# Patient Record
Sex: MALE | Race: WHITE | URBAN - METROPOLITAN AREA
[De-identification: names, ages, dates, MRNs, and addresses within clinical notes are randomized per-mention and may not be internally consistent; named-entity substitution may affect disease eponyms.]

---

## 2020-12-03 ENCOUNTER — OFFICE VISIT (OUTPATIENT)
Dept: URBAN - METROPOLITAN AREA CLINIC 27 | Facility: CLINIC | Age: 80
End: 2020-12-03
Payer: MEDICARE

## 2020-12-03 DIAGNOSIS — Z96.1 PRESENCE OF INTRAOCULAR LENS: ICD-10-CM

## 2020-12-03 PROCEDURE — 92134 CPTRZ OPH DX IMG PST SGM RTA: CPT | Performed by: OPHTHALMOLOGY

## 2020-12-03 PROCEDURE — 67028 INJECTION EYE DRUG: CPT | Performed by: OPHTHALMOLOGY

## 2020-12-03 PROCEDURE — 92014 COMPRE OPH EXAM EST PT 1/>: CPT | Performed by: OPHTHALMOLOGY

## 2020-12-03 ASSESSMENT — INTRAOCULAR PRESSURE
OD: 13
OS: 16

## 2020-12-03 NOTE — IMPRESSION/PLAN
Impression: Central retinal vein occlusion, left eye, with macular edema: H34.8120. OS.
s/p Avastin x 3 (Dr. Ermias Jones) s/p Eylea OS, last 10/22/2020 (Dr. Saavedra Apt) Plan: Pt returns to [facility] for the winter. Exam confirms persistent CME OS ( microns), s/p Eylea 6 weeks ago. I have recommended continuing anti-VEGF treatment and maintaining the treatment interval at 6 weeks to maintain vision (h/o CME exacerbation with extension to 7 weeks). The diagnosis, natural history, and prognosis of CRVO, as well as the R/B/A of laser and anti-VEGF were discussed. The patient understands that treatment may not improve vision but should reduce the risk of further visual loss. The patient elects to proceed with intravitreal Eylea OS, which was performed successfully in the office without complication. 

RTC 6 weeks OCT OU reeval Eylea

## 2020-12-03 NOTE — IMPRESSION/PLAN
Impression: Type 2 diab with mild nonp rtnop without macular edema, bi: [id number]. OU. Plan: Exam and OCT testing do not show evidence of DME or NV, and therefore, no treatment is indicated at this time. The diagnosis, natural history, and prognosis of NPDR were discussed in detail. The importance of tight blood sugar, blood pressure, and cholesterol control and their relationship to progression of NPDR were thoroughly reviewed with the patient.  Last A1C- 5.8

## 2021-01-14 ENCOUNTER — OFFICE VISIT (OUTPATIENT)
Dept: URBAN - METROPOLITAN AREA CLINIC 27 | Facility: CLINIC | Age: 81
End: 2021-01-14
Payer: MEDICARE

## 2021-01-14 PROCEDURE — 92134 CPTRZ OPH DX IMG PST SGM RTA: CPT | Performed by: OPHTHALMOLOGY

## 2021-01-14 PROCEDURE — 67028 INJECTION EYE DRUG: CPT | Performed by: OPHTHALMOLOGY

## 2021-01-14 ASSESSMENT — INTRAOCULAR PRESSURE
OD: 12
OS: 13

## 2021-01-14 NOTE — IMPRESSION/PLAN
Impression: Central retinal vein occlusion, left eye, with macular edema: H34.8120. OS.
s/p Avastin x 3 (Dr. Charles Both) s/p Eylea OS, last 12/3/2020
last DFE OU 12/3/2020 Plan: Exam and OCT show moderate CME OS ( microns), s/p Eylea 6 weeks ago. I have recommended continuing anti-VEGF treatment and maintaining the treatment interval at 6 weeks to maintain vision (h/o CME exacerbation with extension to 7 weeks). The diagnosis, natural history, and prognosis of CRVO, as well as the R/B/A of laser and anti-VEGF were discussed. The patient understands that treatment may not improve vision but should reduce the risk of further visual loss. The patient elects to proceed with intravitreal Eylea OS, which was performed successfully in the office without complication. 

RTC 6 weeks OCT OU reeval Eylea

## 2021-02-25 ENCOUNTER — OFFICE VISIT (OUTPATIENT)
Dept: URBAN - METROPOLITAN AREA CLINIC 27 | Facility: CLINIC | Age: 81
End: 2021-02-25
Payer: MEDICARE

## 2021-02-25 PROCEDURE — 92134 CPTRZ OPH DX IMG PST SGM RTA: CPT | Performed by: OPHTHALMOLOGY

## 2021-02-25 PROCEDURE — 67028 INJECTION EYE DRUG: CPT | Performed by: OPHTHALMOLOGY

## 2021-02-25 ASSESSMENT — INTRAOCULAR PRESSURE
OD: 13
OS: 14

## 2021-02-25 NOTE — IMPRESSION/PLAN
Impression: Central retinal vein occlusion, left eye, with macular edema: H34.8120. OS.
s/p Avastin x 3 (Dr. Marina Mayen) s/p Eylea OS, last 1/14/21
last DFE OU 12/3/2020 Plan: Exam and OCT show improving CME OS ( from 495 microns), s/p Eylea 6 weeks ago. I have recommended continuing anti-VEGF treatment and maintaining the treatment interval at 6 weeks to maintain vision (h/o CME exacerbation with extension to 7 weeks). The diagnosis, natural history, and prognosis of CRVO, as well as the R/B/A of laser and anti-VEGF were discussed. The patient understands that treatment may not improve vision but should reduce the risk of further visual loss. The patient elects to proceed with intravitreal Eylea OS, which was performed successfully in the office without complication. 

6 weeks - Dr. Gordon Coles

## 2021-11-18 ENCOUNTER — OFFICE VISIT (OUTPATIENT)
Dept: URBAN - METROPOLITAN AREA CLINIC 27 | Facility: CLINIC | Age: 81
End: 2021-11-18
Payer: MEDICARE

## 2021-11-18 PROCEDURE — 92134 CPTRZ OPH DX IMG PST SGM RTA: CPT | Performed by: OPHTHALMOLOGY

## 2021-11-18 PROCEDURE — 92014 COMPRE OPH EXAM EST PT 1/>: CPT | Performed by: OPHTHALMOLOGY

## 2021-11-18 RX ORDER — BRIMONIDINE TARTRATE 2 MG/ML
0.2 % SOLUTION/ DROPS OPHTHALMIC
Qty: 10 | Refills: 3 | Status: INACTIVE
Start: 2021-11-18 | End: 2022-02-15

## 2021-11-18 ASSESSMENT — INTRAOCULAR PRESSURE
OD: 14
OS: 26

## 2021-11-18 NOTE — IMPRESSION/PLAN
Impression: Central retinal vein occlusion, left eye, with macular edema: H34.8120. OS.
s/p Avastin x 3 (Dr. Keith Zavala) s/p multiple Eylea OS (Dr. Sasha Chan)
s/p Ozurdex 10/20/21 (Dr. Sasha Chan) Plan: Exam and OCT show improving CME OS ( from 441 microns), s/p Ozurdex 4 weeks ago. IOP is elevated. The diagnosis, natural history, and prognosis of CRVO, as well as the R/B/A of laser and anti-VEGF were discussed. Recommend observation today given improvement. Start brimonidine 0.2 BID for IOP control. 

8-10 weeks - OCT OU re-eval for Ozurdex

## 2021-11-18 NOTE — IMPRESSION/PLAN
Impression: Type 2 diab with mild nonp rtnop without macular edema, bi: [id number]. OU. Plan: Exam and OCT testing do not show evidence of DME or NV, and therefore, no treatment is indicated at this time. The diagnosis, natural history, and prognosis of NPDR were discussed in detail. The importance of tight blood sugar, blood pressure, and cholesterol control and their relationship to progression of NPDR were thoroughly reviewed with the patient.  Last A1C- 5.9

## 2022-01-27 ENCOUNTER — OFFICE VISIT (OUTPATIENT)
Dept: URBAN - METROPOLITAN AREA CLINIC 27 | Facility: CLINIC | Age: 82
End: 2022-01-27
Payer: MEDICARE

## 2022-01-27 DIAGNOSIS — E11.3293 TYPE 2 DIAB WITH MILD NONP RTNOP WITHOUT MACULAR EDEMA, BI: ICD-10-CM

## 2022-01-27 DIAGNOSIS — H34.8120 CENTRAL RETINAL VEIN OCCLUSION, LEFT EYE, WITH MACULAR EDEMA: Primary | ICD-10-CM

## 2022-01-27 PROCEDURE — 92134 CPTRZ OPH DX IMG PST SGM RTA: CPT | Performed by: OPHTHALMOLOGY

## 2022-01-27 PROCEDURE — 67028 INJECTION EYE DRUG: CPT | Performed by: OPHTHALMOLOGY

## 2022-01-27 PROCEDURE — 99213 OFFICE O/P EST LOW 20 MIN: CPT | Performed by: OPHTHALMOLOGY

## 2022-01-27 ASSESSMENT — INTRAOCULAR PRESSURE
OD: 12
OS: 21

## 2022-01-27 NOTE — IMPRESSION/PLAN
Impression: Central retinal vein occlusion, left eye, with macular edema: H34.8120. OS.
s/p Avastin x 3 (Dr. Iris Starr) s/p multiple Eylea OS (Dr. Guillermina Recinos)
s/p Ozurdex 10/20/21 (Dr. Guillermina Recinos) Plan: Exam and OCT show recurrent CME OS ( from 317 microns), s/p Ozurdex 3 months ago. IOP is elevated. The diagnosis, natural history, and prognosis of CRVO, as well as the R/B/A of laser and anti-VEGF were discussed. Recommend additional Ozurdex OS today. Patient elects to proceed with Ozurdex OS today - no complications encountered. Cont brimonidine 0.2 BID for IOP control. 

6 weeks - OCT OU re-eval for Ozurdex

## 2022-01-27 NOTE — IMPRESSION/PLAN
Impression: Type 2 diab with mild nonp rtnop without macular edema, bi: [id number]. OU. Plan: Exam and OCT testing do not show evidence of DME or NV, and therefore, no treatment is indicated at this time. The diagnosis, natural history, and prognosis of NPDR were discussed in detail. The importance of tight blood sugar, blood pressure, and cholesterol control and their relationship to progression of NPDR were thoroughly reviewed with the patient.  Last A1C- 5.9

## 2023-01-10 ENCOUNTER — OFFICE VISIT (OUTPATIENT)
Dept: URBAN - METROPOLITAN AREA CLINIC 27 | Facility: CLINIC | Age: 83
End: 2023-01-10
Payer: MEDICARE

## 2023-01-10 DIAGNOSIS — H34.8120 CENTRAL RETINAL VEIN OCCLUSION, LEFT EYE, WITH MACULAR EDEMA: Primary | ICD-10-CM

## 2023-01-10 DIAGNOSIS — Z96.1 PRESENCE OF INTRAOCULAR LENS: ICD-10-CM

## 2023-01-10 DIAGNOSIS — E11.3293 TYPE 2 DIAB WITH MILD NONP RTNOP WITHOUT MACULAR EDEMA, BI: ICD-10-CM

## 2023-01-10 PROCEDURE — 67028 INJECTION EYE DRUG: CPT | Performed by: OPHTHALMOLOGY

## 2023-01-10 PROCEDURE — 92134 CPTRZ OPH DX IMG PST SGM RTA: CPT | Performed by: OPHTHALMOLOGY

## 2023-01-10 PROCEDURE — 99214 OFFICE O/P EST MOD 30 MIN: CPT | Performed by: OPHTHALMOLOGY

## 2023-01-10 ASSESSMENT — INTRAOCULAR PRESSURE
OS: 17
OD: 10

## 2023-01-10 NOTE — IMPRESSION/PLAN
Impression: Type 2 diab with mild nonp rtnop without macular edema, bi: [id number]. OU. Plan: Exam and OCT testing do not show evidence of DME or NV, and therefore, no treatment is indicated at this time. The diagnosis, natural history, and prognosis of NPDR were discussed in detail. The importance of tight blood sugar, blood pressure, and cholesterol control and their relationship to progression of NPDR were thoroughly reviewed with the patient.  Last A1C- 6.7

## 2023-01-10 NOTE — IMPRESSION/PLAN
Impression: Central retinal vein occlusion, left eye, with macular edema: H34.8120. OS.
s/p Avastin x 3 (Dr. Nadeen Rainey) s/p multiple Eylea OS (Dr. Tino Treviño)
s/p Ozurdex 10/12/22(Dr. Tino Treviño) Plan: Exam and OCT show recurrent CME OS ( microns), s/p Ozurdex 3 months ago. IOP is stable. The diagnosis, natural history, and prognosis of CRVO, as well as the R/B/A of laser and anti-VEGF were discussed. Recommend additional Ozurdex OS today. Patient elects to proceed with Ozurdex OS - no complications encountered. Cont brimonidine 0.2 BID for IOP control. 

3 months - Dr. Tino Treviño in [facility]

## 2024-01-23 ENCOUNTER — OFFICE VISIT (OUTPATIENT)
Dept: URBAN - METROPOLITAN AREA CLINIC 27 | Facility: CLINIC | Age: 84
End: 2024-01-23
Payer: MEDICARE

## 2024-01-23 DIAGNOSIS — Z96.1 PRESENCE OF INTRAOCULAR LENS: ICD-10-CM

## 2024-01-23 DIAGNOSIS — E11.3293 TYPE 2 DIAB WITH MILD NONP RTNOP WITHOUT MACULAR EDEMA, BI: ICD-10-CM

## 2024-01-23 DIAGNOSIS — H34.8120 CENTRAL RETINAL VEIN OCCLUSION, LEFT EYE, WITH MACULAR EDEMA: Primary | ICD-10-CM

## 2024-01-23 PROCEDURE — 92134 CPTRZ OPH DX IMG PST SGM RTA: CPT | Performed by: OPHTHALMOLOGY

## 2024-01-23 PROCEDURE — 67028 INJECTION EYE DRUG: CPT | Performed by: OPHTHALMOLOGY

## 2024-01-23 PROCEDURE — 99213 OFFICE O/P EST LOW 20 MIN: CPT | Performed by: OPHTHALMOLOGY

## 2024-01-23 ASSESSMENT — INTRAOCULAR PRESSURE
OS: 14
OD: 13

## 2025-01-29 ENCOUNTER — OFFICE VISIT (OUTPATIENT)
Dept: URBAN - METROPOLITAN AREA CLINIC 27 | Facility: CLINIC | Age: 85
End: 2025-01-29
Payer: MEDICARE

## 2025-01-29 DIAGNOSIS — E11.3293 TYPE 2 DIAB WITH MILD NONP RTNOP WITHOUT MACULAR EDEMA, BI: ICD-10-CM

## 2025-01-29 DIAGNOSIS — H34.8120 CENTRAL RETINAL VEIN OCCLUSION, LEFT EYE, WITH MACULAR EDEMA: Primary | ICD-10-CM

## 2025-01-29 DIAGNOSIS — Z96.1 PRESENCE OF INTRAOCULAR LENS: ICD-10-CM

## 2025-01-29 PROCEDURE — 67028 INJECTION EYE DRUG: CPT | Performed by: OPHTHALMOLOGY

## 2025-01-29 PROCEDURE — 99214 OFFICE O/P EST MOD 30 MIN: CPT | Performed by: OPHTHALMOLOGY

## 2025-01-29 PROCEDURE — 92134 CPTRZ OPH DX IMG PST SGM RTA: CPT | Performed by: OPHTHALMOLOGY

## 2025-01-29 ASSESSMENT — INTRAOCULAR PRESSURE
OD: 14
OS: 23